# Patient Record
Sex: MALE | Race: WHITE
[De-identification: names, ages, dates, MRNs, and addresses within clinical notes are randomized per-mention and may not be internally consistent; named-entity substitution may affect disease eponyms.]

---

## 2020-11-25 ENCOUNTER — HOSPITAL ENCOUNTER (INPATIENT)
Dept: HOSPITAL 92 - SURG A | Age: 60
LOS: 6 days | Discharge: HOME | DRG: 454 | End: 2020-12-01
Attending: NEUROLOGICAL SURGERY | Admitting: NEUROLOGICAL SURGERY
Payer: COMMERCIAL

## 2020-11-25 VITALS — BODY MASS INDEX: 31.1 KG/M2

## 2020-11-25 DIAGNOSIS — M48.02: Primary | ICD-10-CM

## 2020-11-25 DIAGNOSIS — Z79.84: ICD-10-CM

## 2020-11-25 DIAGNOSIS — M50.01: ICD-10-CM

## 2020-11-25 DIAGNOSIS — F17.210: ICD-10-CM

## 2020-11-25 DIAGNOSIS — E11.9: ICD-10-CM

## 2020-11-25 DIAGNOSIS — M47.12: ICD-10-CM

## 2020-11-25 DIAGNOSIS — I10: ICD-10-CM

## 2020-11-25 DIAGNOSIS — E66.9: ICD-10-CM

## 2020-11-25 DIAGNOSIS — Z20.828: ICD-10-CM

## 2020-11-25 DIAGNOSIS — E78.5: ICD-10-CM

## 2020-11-25 DIAGNOSIS — K59.00: ICD-10-CM

## 2020-11-25 DIAGNOSIS — Z79.899: ICD-10-CM

## 2020-11-25 PROCEDURE — C1768 GRAFT, VASCULAR: HCPCS

## 2020-11-25 PROCEDURE — 76000 FLUOROSCOPY <1 HR PHYS/QHP: CPT

## 2020-11-25 PROCEDURE — C1713 ANCHOR/SCREW BN/BN,TIS/BN: HCPCS

## 2020-11-25 PROCEDURE — C1776 JOINT DEVICE (IMPLANTABLE): HCPCS

## 2020-11-30 PROCEDURE — 0RG10A0 FUSION OF CERVICAL VERTEBRAL JOINT WITH INTERBODY FUSION DEVICE, ANTERIOR APPROACH, ANTERIOR COLUMN, OPEN APPROACH: ICD-10-PCS | Performed by: NEUROLOGICAL SURGERY

## 2020-11-30 PROCEDURE — 0RG1071 FUSION OF CERVICAL VERTEBRAL JOINT WITH AUTOLOGOUS TISSUE SUBSTITUTE, POSTERIOR APPROACH, POSTERIOR COLUMN, OPEN APPROACH: ICD-10-PCS | Performed by: NEUROLOGICAL SURGERY

## 2020-11-30 PROCEDURE — 00NW0ZZ RELEASE CERVICAL SPINAL CORD, OPEN APPROACH: ICD-10-PCS | Performed by: NEUROLOGICAL SURGERY

## 2020-11-30 PROCEDURE — 0RB30ZZ EXCISION OF CERVICAL VERTEBRAL DISC, OPEN APPROACH: ICD-10-PCS | Performed by: NEUROLOGICAL SURGERY

## 2020-11-30 PROCEDURE — 0PP304Z REMOVAL OF INTERNAL FIXATION DEVICE FROM CERVICAL VERTEBRA, OPEN APPROACH: ICD-10-PCS | Performed by: NEUROLOGICAL SURGERY

## 2020-11-30 RX ADMIN — HYDROCODONE BITARTRATE AND ACETAMINOPHEN PRN TAB: 10; 325 TABLET ORAL at 21:58

## 2020-11-30 RX ADMIN — CEFAZOLIN SODIUM SCH MLS: 2 SOLUTION INTRAVENOUS at 21:59

## 2020-11-30 NOTE — OP
DATE OF PROCEDURE:  11/30/2020



ASSISTANT:  Eric.



PROCEDURES PERFORMED:  Removal of hardware, C4 through C7; expiration of spinal

fusion, C4 through C7; anterior cervical diskectomy, C3-C4; interbody arthrodesis;

intervertebral biomechanical device; local morselized autograft; demineralized bone

matrix; anterior titanium instrumentation, C3-4.  Posterior approach, C3-4

laminectomy; posterolateral arthrodesis, C3-4; lateral mass screws, C3-4;

demineralized bone matrix; local morselized autograft. 



DESCRIPTION OF PROCEDURE:  The patient was brought to the operating room and

intubated.  He was positioned supine with head in modest extension on a gel-filled

donut.  A new incision was made in transverse fashion in the right precervical area

and dissected medial to the sternocleidomastoid muscle.  We identified the anterior

cervical spine and identified the prior plate.  The prior plate was removed without

difficulty and the C4 through C7 levels were explored and seemed to be solidly

fused. 



We next placed distraction across C3-4 and removed the intervertebral disk and using

the operative microscope and microdissection techniques, completely decompressed

down to the dura from foramen to foramen.  The bony endplates were then decorticated

for the purpose of arthrodesis and appropriate-sized intervertebral biomechanical

PEEK device was brought into the field, filled with demineralized bone matrix; local

morselized autograft, and tapped in place securely at C3-4. 



Next, an anterior plate was brought into field and secured to C3 and C4 using two

14-mm screws at each level.  The wound was then extensively irrigated.  MAC

hemostasis was secured, and the wound was closed in anatomic layers over drain. 



The patient was then rolled in a prone position on gel-filled chest rolls with the

head fixed in a Fe head walker in a neutral position.  A midline incision was

made, and C3 and C4 levels were identified.  We performed complete C4 and complete

C3 laminectomies for the purpose of decompression. 



We next placed right-sided lateral mass screws at right C3 and right C4 connected by

rods and secured by nuts.  The wound was then extensively irrigated.  MAC hemostasis

was secured.  A combination of demineralized bone matrix; local morselized autograft

was laid over the lamina posterolateral surfaces for the purpose of arthrodesis.

Vancomycin powder was applied, and the wound was closed in anatomic layers over

drain. 







Job ID:  [id number]

## 2020-12-01 VITALS — TEMPERATURE: 98.6 F | DIASTOLIC BLOOD PRESSURE: 82 MMHG | SYSTOLIC BLOOD PRESSURE: 145 MMHG

## 2020-12-01 RX ADMIN — CEFAZOLIN SODIUM SCH MLS: 2 SOLUTION INTRAVENOUS at 05:32

## 2020-12-01 RX ADMIN — HYDROCODONE BITARTRATE AND ACETAMINOPHEN PRN TAB: 10; 325 TABLET ORAL at 05:34

## 2020-12-01 NOTE — DIS
DATE OF ADMISSION:  11/30/2020



DATE OF DISCHARGE:  12/01/2020



PROCEDURE:  Removal of hardware, C3-C4 anterior and posterior decompression and

fusion. 



DISCHARGE SUMMARY:  The patient is a 60-year-old male, known to us for prior C4-C7

ACDF years ago, who was recently evaluated for progressive neck pain and myelopathic

symptoms.  He was found to have severe stenosis above this prior fusion at C3-C4.

He underwent removal of hardware and C3-C4 anterior and posterior decompression and

fusion.  Following the surgery, he was transitioned to the Med/Surg floor, where his

pain has been well controlled with p.o. medications, he is tolerating a regular

diet, and he is voiding appropriately.  He has been ambulating easily in the

hallways.  ISAEL-A had 35 mL out overnight.  ISAEL-B had 0 mL out overnight.  These were

removed on postoperative day #1.  We will plan to dismiss to home.  I have discussed

home care precautions.  We will follow up with the patient in 2 weeks.  The patient

was provided with scripts for Norco, diazepam, and Keflex.  [facility]al[facility] checked prior

to discharge. 







Job ID:  [id number]

## 2020-12-17 ENCOUNTER — HOSPITAL ENCOUNTER (OUTPATIENT)
Dept: HOSPITAL 92 - TBSIIMAG | Age: 60
Discharge: HOME | End: 2020-12-17
Attending: NEUROLOGICAL SURGERY
Payer: COMMERCIAL

## 2020-12-17 DIAGNOSIS — M47.22: ICD-10-CM

## 2020-12-17 DIAGNOSIS — Z98.890: ICD-10-CM

## 2020-12-17 DIAGNOSIS — M47.12: Primary | ICD-10-CM

## 2020-12-17 PROCEDURE — 72040 X-RAY EXAM NECK SPINE 2-3 VW: CPT

## 2020-12-17 NOTE — RAD
CERVICAL SPINE 4 VIEWS:

 

HISTORY: 

Cervical radiculopathy.  Postop followup.

 

COMPARISON: 

Comparison is made to cervical spine films from 2014.

 

FINDINGS: 

Prior exam showed anterior plate and screws with interbody implants at the C4, C5, C6, and C7 levels.
  

 

On today's exam, the prior plate and screws have been removed and there are now anterior plate and sc
rews transfixing C3 and C4.  The interbody implants noted previously remain in place and there is par
tial fusion.  There is new interbody implant at C3-4. There are also posterior pedicle screws with ro
ds on the right at these levels.

 

Posterior skin staples are seen.  

 

Degenerative spondylytic changes are noted at the C5-6, C6-7 levels and there are anterior bridging o
steophytes with partial fusion at these levels.

 

IMPRESSION: 

[id number] and degenerative changes of the cervical spine noted.

 

POS: AGW

## 2021-01-26 ENCOUNTER — HOSPITAL ENCOUNTER (OUTPATIENT)
Dept: HOSPITAL 92 - TBSIIMAG | Age: 61
Discharge: HOME | End: 2021-01-26
Attending: NEUROLOGICAL SURGERY
Payer: COMMERCIAL

## 2021-01-26 DIAGNOSIS — M47.12: Primary | ICD-10-CM

## 2021-01-26 DIAGNOSIS — Z98.1: ICD-10-CM

## 2021-01-26 PROCEDURE — 72040 X-RAY EXAM NECK SPINE 2-3 VW: CPT

## 2021-01-26 NOTE — RAD
CERVICAL SPINE 3 VIEWS:

 

Date:  01/26/2021

 

HISTORY:  

Spondylolysis with myelopathy. Status post surgery 7 weeks ago with pain.

 

COMPARISON:  

12/17/2020. 

 

FINDINGS:

Anterior cervical fusion changes C3-C4 with intradiscal prosthesis. In addition, there are intradisca
l prosthesis changes at C4-C5, C5-C6, and C6-C7. No significant malalignment. Minimal prevertebral so
ft tissue swelling fullness overlying the lower cervical spine. Overall stable appearance from prior 
study. 

 

IMPRESSION: 

Stable postoperative changes. 

 

POS: OFF